# Patient Record
Sex: MALE | Race: WHITE
[De-identification: names, ages, dates, MRNs, and addresses within clinical notes are randomized per-mention and may not be internally consistent; named-entity substitution may affect disease eponyms.]

---

## 2018-04-13 ENCOUNTER — HOSPITAL ENCOUNTER (EMERGENCY)
Dept: HOSPITAL 31 - C.ER | Age: 43
Discharge: HOME | End: 2018-04-13
Payer: COMMERCIAL

## 2018-04-13 VITALS
TEMPERATURE: 98.2 F | SYSTOLIC BLOOD PRESSURE: 145 MMHG | DIASTOLIC BLOOD PRESSURE: 100 MMHG | HEART RATE: 103 BPM | OXYGEN SATURATION: 98 % | RESPIRATION RATE: 18 BRPM

## 2018-04-13 DIAGNOSIS — N48.89: Primary | ICD-10-CM
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
## 2019-02-03 ENCOUNTER — HOSPITAL ENCOUNTER (EMERGENCY)
Dept: HOSPITAL 31 - C.ER | Age: 44
Discharge: HOME | End: 2019-02-03
Payer: COMMERCIAL

## 2019-02-03 VITALS — TEMPERATURE: 97.3 F | HEART RATE: 79 BPM | DIASTOLIC BLOOD PRESSURE: 90 MMHG | SYSTOLIC BLOOD PRESSURE: 155 MMHG

## 2019-02-03 VITALS — BODY MASS INDEX: 29.7 KG/M2

## 2019-02-03 VITALS — RESPIRATION RATE: 18 BRPM

## 2019-02-03 VITALS — OXYGEN SATURATION: 98 %

## 2019-02-03 DIAGNOSIS — S50.11XA: Primary | ICD-10-CM
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
## 2019-02-03 LAB
ALBUMIN SERPL-MCNC: 4.8 G/DL (ref 3.5–5)
ALBUMIN/GLOB SERPL: 1.4 {RATIO} (ref 1–2.1)
ALT SERPL-CCNC: 38 U/L (ref 21–72)
APTT BLD: 38 SECONDS (ref 21–34)
AST SERPL-CCNC: 33 U/L (ref 17–59)
BASOPHILS # BLD AUTO: 0 K/UL (ref 0–0.2)
BASOPHILS NFR BLD: 1 % (ref 0–2)
BUN SERPL-MCNC: 15 MG/DL (ref 9–20)
CALCIUM SERPL-MCNC: 9.4 MG/DL (ref 8.6–10.4)
EOSINOPHIL # BLD AUTO: 0 K/UL (ref 0–0.7)
EOSINOPHIL NFR BLD: 1 % (ref 0–4)
ERYTHROCYTE [DISTWIDTH] IN BLOOD BY AUTOMATED COUNT: 13 % (ref 11.5–14.5)
GFR NON-AFRICAN AMERICAN: > 60
HGB BLD-MCNC: 16.9 G/DL (ref 12–18)
INR PPP: 1.1
LYMPHOCYTES # BLD AUTO: 1.5 K/UL (ref 1–4.3)
LYMPHOCYTES NFR BLD AUTO: 32.8 % (ref 20–40)
MCH RBC QN AUTO: 27.6 PG (ref 27–31)
MCHC RBC AUTO-ENTMCNC: 33.6 G/DL (ref 33–37)
MCV RBC AUTO: 82.3 FL (ref 80–94)
MONOCYTES # BLD: 0.4 K/UL (ref 0–0.8)
MONOCYTES NFR BLD: 8.6 % (ref 0–10)
NEUTROPHILS # BLD: 2.7 K/UL (ref 1.8–7)
NEUTROPHILS NFR BLD AUTO: 56.6 % (ref 50–75)
NRBC BLD AUTO-RTO: 0.1 % (ref 0–2)
PLATELET # BLD: 274 K/UL (ref 130–400)
PMV BLD AUTO: 8.8 FL (ref 7.2–11.7)
PROTHROMBIN TIME: 11.9 SECONDS (ref 9.7–12.2)
RBC # BLD AUTO: 6.12 MIL/UL (ref 4.4–5.9)
WBC # BLD AUTO: 4.7 K/UL (ref 4.8–10.8)

## 2019-02-03 PROCEDURE — 99284 EMERGENCY DEPT VISIT MOD MDM: CPT

## 2019-02-03 PROCEDURE — 80053 COMPREHEN METABOLIC PANEL: CPT

## 2019-02-03 PROCEDURE — 85730 THROMBOPLASTIN TIME PARTIAL: CPT

## 2019-02-03 PROCEDURE — 85610 PROTHROMBIN TIME: CPT

## 2019-02-03 PROCEDURE — 73200 CT UPPER EXTREMITY W/O DYE: CPT

## 2019-02-03 PROCEDURE — 85025 COMPLETE CBC W/AUTO DIFF WBC: CPT

## 2019-02-03 PROCEDURE — 96360 HYDRATION IV INFUSION INIT: CPT

## 2019-02-03 NOTE — CT
Date of service: 



02/03/2019



PROCEDURE:  RIGHT UPPER EXTREMITY CT WITHOUT CONTRAST



HISTORY:

pain, ecchymosis, ? partial biceps rupture



COMPARISON:

None available.



TECHNIQUE:

A volumetric CT acquisition through the right upper arm has been 

performed from the shoulder to elbow without intravenous contrast.  

Reformatted dataset have been provided for interpretation.



Contrast Dose: None



Radiation dose:Total exam DLP = 831.41 mGy-cm.



This CT exam was performed using one or more of the following dose 

reduction techniques: Automated exposure control, adjustment of the 

mA and/or kV according to patient size, and/or use of iterative 

reconstruction technique.







FINDINGS:

Unfortunate, definition of the biceps muscle and tendon is quite poor 

in this exam which is not unusual for CT, and therefore even a 

full-thickness biceps tear would be difficult to exclude on the basis 

of this study.  No local reactive changes are appreciated associated 

with the proximal and distal tendons and tendon tear is not 

suspected.  Standard of care would be for follow-up MRI for far 

greater soft tissue resolution capability.



The humerus is intact without fracture or destructive bony lesion 

appreciated.  No subluxation or dislocation of the right shoulder or 

elbow joints.  No large joint effusion appreciable.



IMPRESSION:

MRI is advised for added characterization of the biceps tendon 

complex in the right upper extremity due to lack of adequate tissue 

resolution to exclude tear of the biceps or other muscle groups at 

the right upper extremity.  No fracture or destructive bony lesion.  

No dislocation.

## 2019-02-03 NOTE — C.PDOC
History Of Present Illness


43 year old male presents to the ED complaining of pain to the right elbow for 3

days status post pulling an object. States he noted bruising to the medial part 

of the forearm and elbow region. Notes pain when flexing elbow. Denies any 

numbness, weakness, or tingling of right arm. Denies any other complaints. 

Denies taking any medications for the pain. 


Time Seen by Provider: 02/03/19 09:49


Chief Complaint (Nursing): Upper Extremity Problem/Injury


History Per: Patient


History/Exam Limitations: no limitations


Onset/Duration Of Symptoms: Days


Current Symptoms Are (Timing): Still Present


Quality: "Pain"


Exacerbating Factor(s): Movement





Past Medical History


Reviewed: Historical Data, Nursing Documentation, Vital Signs


Vital Signs: 





                                Last Vital Signs











Temp  99.5 F   02/03/19 09:34


 


Pulse  84   02/03/19 09:34


 


Resp  18   02/03/19 09:34


 


BP  128/89   02/03/19 09:34


 


Pulse Ox  98   02/03/19 09:34














- Medical History


PMH: HTN, Hypercholesterolemia


Surgical History: No Surg Hx





- [facility] Procedures











CLOSURE SKIN & SUBCUTANEOUS NEC (06/19/14)


TETANUS TOXOID ADMINIST (06/19/14)








Family History: States: No Known Family Hx





- Social History


Hx Tobacco Use: No


Hx Alcohol Use: No


Hx Substance Use: No





- Immunization History


Hx Tetanus Toxoid Vaccination: No


Hx Influenza Vaccination: No


Hx Pneumococcal Vaccination: No





Review Of Systems


Except As Marked, All Systems Reviewed And Found Negative.


Constitutional: Negative for: Fever, Chills


Musculoskeletal: Positive for: Other (pain and bruising to right elbow  )


Neurological: Negative for: Weakness, Numbness





Physical Exam





- Physical Exam


Appears: Non-toxic, No Acute Distress


Skin: Warm, Dry, Ecchymosis (medial part of elbodistal part of upper arm and 

proximal part of forearm )


Head: Normacephalic


Neck: Supple


Cardiovascular: Rhythm Regular


Respiratory: Normal Breath Sounds, No Rales, No Rhonchi, No Wheezing


Extremity: No Normal ROM (decreased ROM secondary to painin the elbow area), 

Tenderness (over distal part of biceps, no bony tenderness), No Deformity, Other

(right biceps more prominent than left biceps, no displacement noted )


Pulses: Left Brachial: Normal, Right Brachial: Normal, Left Radial: Normal, 

Right Radial: Normal


Neurological/Psych: Oriented x3, Normal Speech, Normal Cognition, Normal Motor, 

Normal Sensation


Gait: Steady





ED Course And Treatment





- Laboratory Results


Result Diagrams: 


                                 02/03/19 10:52





                                 02/03/19 10:52


Lab Results: 





                                        











PT  11.9 SECONDS (9.7-12.2)   02/03/19  10:52    


 


INR  1.1   02/03/19  10:52    


 


APTT  38 SECONDS (21-34)  H  02/03/19  10:52    








                                        











Total Bilirubin  0.9 mg/dL (0.2-1.3)   02/03/19  10:52    


 


AST  33 U/L (17-59)   02/03/19  10:52    


 


ALT  38 U/L (21-72)   02/03/19  10:52    


 


Alkaline Phosphatase  63 U/L ()   02/03/19  10:52    


 


Total Protein  8.2 g/dL (6.3-8.3)   02/03/19  10:52    


 


Albumin  4.8 g/dL (3.5-5.0)   02/03/19  10:52    


 


Globulin  3.3 gm/dL (2.2-3.9)   02/03/19  10:52    


 


Albumin/Globulin Ratio  1.4  (1.0-2.1)   02/03/19  10:52    











O2 Sat by Pulse Oximetry: 98 (RA)


Pulse Ox Interpretation: Normal





- CT Scan/US


  ** CT URE


Other Rad Studies (CT/US): Read By Radiologist, Radiology Report Reviewed


CT/US Interpretation: Accession No. : [id number].  Patient Name / ID : DARIUS VALENZUELA  / [id number].  Exam Date : 02/03/2019 11:26:12 ( Approved ).  Study Comment

:  Sex / Age : M  / 43Y.  Creator : Candice Mari.  Dictator : Roberth Devine MD.   :  Approver : Roberth Devine MD.  Approver2 :  

Report Date : 02/03/2019 12:25:17.  My Comment :  

********************************************************************************


***.  Date of service:  02/03/2019.  PROCEDURE:  RIGHT UPPER EXTREMITY CT 

WITHOUT CONTRAST.  HISTORY:  pain, ecchymosis, ? partial biceps rupture.  

COMPARISON:  None available.  TECHNIQUE:  A volumetric CT acquisition through 

the right upper arm has been performed from the shoulder to elbow without 

intravenous contrast.  Reformatted dataset have been provided for 

interpretation.  Contrast Dose: None.  Radiation dose:Total exam DLP = 831.41 

mGy-cm.  This CT exam was performed using one or more of the following dose 

reduction techniques: Automated exposure control, adjustment of the mA and/or kV

according to patient size, and/or use of iterative reconstruction technique.  

FINDINGS:  Unfortunate, definition of the biceps muscle and tendon is quite poor

in this exam which is not unusual for CT, and therefore even a full-thickness 

biceps tear would be difficult to exclude on the basis of this study.  No local 

reactive changes are appreciated associated with the proximal and distal tendons

and tendon tear is not suspected.  Standard of care would be for follow-up MRI 

for far greater soft tissue resolution capability.  The humerus is intact 

without fracture or destructive bony lesion appreciated.  No subluxation or 

dislocation of the right shoulder or elbow joints.  No large joint effusion 

appreciable.  IMPRESSION:  MRI is advised for added characterization of the 

biceps tendon complex in the right upper extremity due to lack of adequate 

tissue resolution to exclude tear of the biceps or other muscle groups at the 

right upper extremity.  No fracture or destructive bony lesion.  No dislocation.


Progress Note: CT of upper right extremity ordered. Patient treated with fluids.

Blood collected and sent to the lab for analysis.  Arm sling was applied. 

Patient was referred to Ortho. Ct didn't show any fractures, suboptimal study 

for soft tissue evaluation.





Disposition





- Disposition


Referrals: 


Roel Rincon III, MD [Staff Provider] - 


Disposition: HOME/ ROUTINE


Disposition Time: 13:40


Condition: STABLE


Additional Instructions: 


Follow up with PMD and Orthopedist within 1-2 days. Return to ED if feel worse.


Instructions:  Biceps Tendon Rupture


Forms:  [employer] (English)





- Clinical Impression


Clinical Impression: 


 Arm injury








- PA / NP / Resident Statement


MD/DO has reviewed & agrees with the documentation as recorded.





- Scribe Statement


The provider has reviewed the documentation as recorded by the Scrib[employer] [place]








All medical record entries made by the Scribe were at my direction and 

personally dictated by me. I have reviewed the chart and agree that the record 

accurately reflects my personal performance of the history, physical exam, 

medical decision making, and the department course for this patient. I have also

personally directed, reviewed, and agree with the discharge instructions and 

disposition.

## 2019-02-08 ENCOUNTER — HOSPITAL ENCOUNTER (EMERGENCY)
Dept: HOSPITAL 31 - C.ER | Age: 44
Discharge: LEFT BEFORE DISCHARGE | End: 2019-02-08
Payer: COMMERCIAL

## 2019-02-08 VITALS — BODY MASS INDEX: 29.7 KG/M2

## 2019-02-08 DIAGNOSIS — Z02.89: Primary | ICD-10-CM

## 2019-02-08 DIAGNOSIS — M79.603: ICD-10-CM
